# Patient Record
Sex: FEMALE | Race: WHITE | NOT HISPANIC OR LATINO | Employment: OTHER | URBAN - METROPOLITAN AREA
[De-identification: names, ages, dates, MRNs, and addresses within clinical notes are randomized per-mention and may not be internally consistent; named-entity substitution may affect disease eponyms.]

---

## 2017-06-23 ENCOUNTER — ESTABLISHED COMPREHENSIVE EXAM (OUTPATIENT)
Dept: URBAN - METROPOLITAN AREA CLINIC 46 | Facility: CLINIC | Age: 72
End: 2017-06-23

## 2017-12-20 ASSESSMENT — TONOMETRY
OS_IOP_MMHG: 13
OD_IOP_MMHG: 14

## 2017-12-20 ASSESSMENT — VISUAL ACUITY
OS_SC: 20/50+2
OS_SC: J2
OD_SC: 20/50+2
OD_SC: J2

## 2017-12-27 ENCOUNTER — ESTABLISHED PATIENT (OUTPATIENT)
Dept: URBAN - METROPOLITAN AREA CLINIC 46 | Facility: CLINIC | Age: 72
End: 2017-12-27

## 2017-12-27 DIAGNOSIS — H04.123: ICD-10-CM

## 2017-12-27 DIAGNOSIS — H35.373: ICD-10-CM

## 2017-12-27 DIAGNOSIS — H40.013: ICD-10-CM

## 2017-12-27 PROCEDURE — 1036F TOBACCO NON-USER: CPT

## 2017-12-27 PROCEDURE — 92083 EXTENDED VISUAL FIELD XM: CPT

## 2017-12-27 PROCEDURE — 92012 INTRM OPH EXAM EST PATIENT: CPT

## 2017-12-27 PROCEDURE — G8427 DOCREV CUR MEDS BY ELIG CLIN: HCPCS

## 2017-12-27 PROCEDURE — 68761S PUNCTUM PLUG / SILICONE,EACH

## 2017-12-27 ASSESSMENT — VISUAL ACUITY
OD_PH: 20/30
OS_PH: 20/30
OD_SC: 20/60-1
OS_SC: 20/40-1

## 2017-12-27 ASSESSMENT — TONOMETRY
OD_IOP_MMHG: 13
OS_IOP_MMHG: 13

## 2018-02-28 ENCOUNTER — ESTABLISHED PATIENT (OUTPATIENT)
Dept: URBAN - METROPOLITAN AREA CLINIC 46 | Facility: CLINIC | Age: 73
End: 2018-02-28

## 2018-02-28 DIAGNOSIS — H01.004: ICD-10-CM

## 2018-02-28 DIAGNOSIS — H01.001: ICD-10-CM

## 2018-02-28 DIAGNOSIS — H04.123: ICD-10-CM

## 2018-02-28 PROCEDURE — 1036F TOBACCO NON-USER: CPT

## 2018-02-28 PROCEDURE — 92012 INTRM OPH EXAM EST PATIENT: CPT

## 2018-02-28 PROCEDURE — A4262 TEMPORARY TEAR DUCT PLUG: HCPCS

## 2018-02-28 PROCEDURE — 68761Q PUNCTAL PLUG/QUINTESS DISSOLVABLE PLUG/EACH

## 2018-02-28 PROCEDURE — G8427 DOCREV CUR MEDS BY ELIG CLIN: HCPCS

## 2018-02-28 RX ORDER — NEOMYCIN SULFATE, POLYMYXIN B SULFATE AND DEXAMETHASONE 3.5; 10000; 1 MG/G; [USP'U]/G; MG/G: OINTMENT OPHTHALMIC TWICE A DAY

## 2018-02-28 ASSESSMENT — TONOMETRY
OS_IOP_MMHG: 14
OD_IOP_MMHG: 14

## 2018-02-28 ASSESSMENT — VISUAL ACUITY
OS_SC: 20/40-1
OD_SC: 20/30+2

## 2018-03-13 ENCOUNTER — ESTABLISHED PATIENT (OUTPATIENT)
Dept: URBAN - METROPOLITAN AREA CLINIC 46 | Facility: CLINIC | Age: 73
End: 2018-03-13

## 2018-03-13 DIAGNOSIS — D49.2: ICD-10-CM

## 2018-03-13 PROCEDURE — 67810 INCAL BX EYELID SKN LID MRGN: CPT

## 2018-03-13 PROCEDURE — 4040F PNEUMOC VAC/ADMIN/RCVD: CPT

## 2018-03-13 PROCEDURE — 1036F TOBACCO NON-USER: CPT

## 2018-03-13 PROCEDURE — G8428 CUR MEDS NOT DOCUMENT: HCPCS

## 2018-03-13 PROCEDURE — G8482 FLU IMMUNIZE ORDER/ADMIN: HCPCS

## 2018-03-13 PROCEDURE — 99213 OFFICE O/P EST LOW 20 MIN: CPT

## 2018-03-13 ASSESSMENT — VISUAL ACUITY
OD_SC: 20/30
OS_SC: 20/40

## 2018-06-20 ENCOUNTER — ESTABLISHED PATIENT (OUTPATIENT)
Dept: URBAN - METROPOLITAN AREA CLINIC 46 | Facility: CLINIC | Age: 73
End: 2018-06-20

## 2018-06-20 DIAGNOSIS — H40.013: ICD-10-CM

## 2018-06-20 PROCEDURE — 1036F TOBACCO NON-USER: CPT

## 2018-06-20 PROCEDURE — G8427 DOCREV CUR MEDS BY ELIG CLIN: HCPCS

## 2018-06-20 PROCEDURE — 92133 CPTRZD OPH DX IMG PST SGM ON: CPT

## 2018-06-20 PROCEDURE — 92012 INTRM OPH EXAM EST PATIENT: CPT

## 2018-06-20 RX ORDER — LATANOPROST 50 UG/ML: 1 SOLUTION/ DROPS OPHTHALMIC EVERY EVENING

## 2018-06-20 ASSESSMENT — VISUAL ACUITY
OD_SC: 20/50
OD_PH: 20/25-1
OS_SC: 20/30-1

## 2018-06-20 ASSESSMENT — TONOMETRY
OS_IOP_MMHG: 14
OD_IOP_MMHG: 15

## 2018-08-22 ENCOUNTER — FOLLOW UP (OUTPATIENT)
Dept: URBAN - METROPOLITAN AREA CLINIC 46 | Facility: CLINIC | Age: 73
End: 2018-08-22

## 2018-08-22 DIAGNOSIS — H04.123: ICD-10-CM

## 2018-08-22 DIAGNOSIS — H40.1131: ICD-10-CM

## 2018-08-22 PROCEDURE — G9903 PT SCRN TBCO ID AS NON USER: HCPCS

## 2018-08-22 PROCEDURE — 2027F OPTIC NERVE HEAD EVAL DONE: CPT

## 2018-08-22 PROCEDURE — 3285F IOP DOWN <15% OF PRE-SVC LVL: CPT

## 2018-08-22 PROCEDURE — A4262 TEMPORARY TEAR DUCT PLUG: HCPCS

## 2018-08-22 PROCEDURE — 92012 INTRM OPH EXAM EST PATIENT: CPT

## 2018-08-22 PROCEDURE — 0517F GLAUCOMA PLAN OF CARE DOCD: CPT

## 2018-08-22 PROCEDURE — G8427 DOCREV CUR MEDS BY ELIG CLIN: HCPCS

## 2018-08-22 PROCEDURE — 1036F TOBACCO NON-USER: CPT

## 2018-08-22 PROCEDURE — 68761Q PUNCTAL PLUG/QUINTESS DISSOLVABLE PLUG/EACH

## 2018-08-22 ASSESSMENT — VISUAL ACUITY
OS_SC: 20/30-2
OD_SC: 20/70+1
OD_PH: 20/40

## 2018-08-22 ASSESSMENT — TONOMETRY
OD_IOP_MMHG: 14
OS_IOP_MMHG: 15

## 2018-09-11 ENCOUNTER — CONSULT (OUTPATIENT)
Dept: URBAN - METROPOLITAN AREA CLINIC 43 | Facility: CLINIC | Age: 73
End: 2018-09-11

## 2018-09-11 DIAGNOSIS — H40.1131: ICD-10-CM

## 2018-09-11 PROCEDURE — G9903 PT SCRN TBCO ID AS NON USER: HCPCS

## 2018-09-11 PROCEDURE — 2027F OPTIC NERVE HEAD EVAL DONE: CPT

## 2018-09-11 PROCEDURE — 9222650 BILAT EXTENDED OPHTHALMOSCOPY, F/U

## 2018-09-11 PROCEDURE — 1036F TOBACCO NON-USER: CPT

## 2018-09-11 PROCEDURE — G8428 CUR MEDS NOT DOCUMENT: HCPCS

## 2018-09-11 PROCEDURE — 92250 FUNDUS PHOTOGRAPHY W/I&R: CPT

## 2018-09-11 PROCEDURE — 3284F IOP RED >=15% PRE-NTRV LVL: CPT

## 2018-09-11 PROCEDURE — 92014 COMPRE OPH EXAM EST PT 1/>: CPT

## 2018-09-11 PROCEDURE — G8785 BP SCRN NO PERF AT INTERVAL: HCPCS

## 2018-09-11 RX ORDER — PREDNISOLONE ACETATE 10 MG/ML
1 SUSPENSION/ DROPS OPHTHALMIC
Start: 2018-09-11

## 2018-09-11 ASSESSMENT — VISUAL ACUITY
OD_PH: 20/40+2
OS_PH: 20/25
OS_SC: 20/40-2
OD_SC: 20/60-1
OD_SC: J1+
OS_SC: J1

## 2018-09-11 ASSESSMENT — TONOMETRY
OS_IOP_MMHG: 13
OD_IOP_MMHG: 12

## 2018-09-25 ENCOUNTER — FOLLOW UP (OUTPATIENT)
Dept: URBAN - METROPOLITAN AREA CLINIC 43 | Facility: CLINIC | Age: 73
End: 2018-09-25

## 2018-09-25 DIAGNOSIS — H40.1131: ICD-10-CM

## 2018-09-25 PROCEDURE — 1036F TOBACCO NON-USER: CPT

## 2018-09-25 PROCEDURE — 92012 INTRM OPH EXAM EST PATIENT: CPT

## 2018-09-25 PROCEDURE — 2027F OPTIC NERVE HEAD EVAL DONE: CPT

## 2018-09-25 PROCEDURE — 0517F GLAUCOMA PLAN OF CARE DOCD: CPT

## 2018-09-25 PROCEDURE — G9903 PT SCRN TBCO ID AS NON USER: HCPCS

## 2018-09-25 PROCEDURE — G8785 BP SCRN NO PERF AT INTERVAL: HCPCS

## 2018-09-25 PROCEDURE — G8427 DOCREV CUR MEDS BY ELIG CLIN: HCPCS

## 2018-09-25 PROCEDURE — 3285F IOP DOWN <15% OF PRE-SVC LVL: CPT

## 2018-09-25 PROCEDURE — 65855 TRABECULOPLASTY LASER SURG: CPT

## 2018-09-25 RX ORDER — PREDNISOLONE ACETATE 10 MG/ML: 1 SUSPENSION/ DROPS OPHTHALMIC

## 2018-09-25 ASSESSMENT — TONOMETRY
OS_IOP_MMHG: 13
OD_IOP_MMHG: 11

## 2018-09-25 ASSESSMENT — VISUAL ACUITY
OS_SC: 20/30-1
OD_BAT: 20/50
OD_PH: 20/40-1

## 2018-10-17 ENCOUNTER — IOP CHECK (OUTPATIENT)
Dept: URBAN - METROPOLITAN AREA CLINIC 46 | Facility: CLINIC | Age: 73
End: 2018-10-17

## 2018-10-17 DIAGNOSIS — H40.013: ICD-10-CM

## 2018-10-17 DIAGNOSIS — H40.1131: ICD-10-CM

## 2018-10-17 PROCEDURE — 92012 INTRM OPH EXAM EST PATIENT: CPT

## 2018-10-17 PROCEDURE — G8427 DOCREV CUR MEDS BY ELIG CLIN: HCPCS

## 2018-10-17 PROCEDURE — G9903 PT SCRN TBCO ID AS NON USER: HCPCS

## 2018-10-17 PROCEDURE — 0517F GLAUCOMA PLAN OF CARE DOCD: CPT

## 2018-10-17 PROCEDURE — 1036F TOBACCO NON-USER: CPT

## 2018-10-17 PROCEDURE — 2027F OPTIC NERVE HEAD EVAL DONE: CPT

## 2018-10-17 PROCEDURE — 3285F IOP DOWN <15% OF PRE-SVC LVL: CPT

## 2018-10-17 ASSESSMENT — TONOMETRY
OS_IOP_MMHG: 11
OD_IOP_MMHG: 13
OD_IOP_MMHG: 14
OS_IOP_MMHG: 14

## 2018-10-17 ASSESSMENT — VISUAL ACUITY
OS_SC: 20/40-1
OD_PH: 20/40
OS_PH: 20/30-1
OD_SC: 20/70-2

## 2019-04-15 ENCOUNTER — IOP CHECK (OUTPATIENT)
Dept: URBAN - METROPOLITAN AREA CLINIC 46 | Facility: CLINIC | Age: 74
End: 2019-04-15

## 2019-04-15 DIAGNOSIS — H04.123: ICD-10-CM

## 2019-04-15 DIAGNOSIS — H40.1131: ICD-10-CM

## 2019-04-15 DIAGNOSIS — H40.013: ICD-10-CM

## 2019-04-15 PROCEDURE — 92133 CPTRZD OPH DX IMG PST SGM ON: CPT

## 2019-04-15 PROCEDURE — 92012 INTRM OPH EXAM EST PATIENT: CPT

## 2019-04-15 PROCEDURE — 68761Q PUNCTAL PLUG/QUINTESS DISSOLVABLE PLUG/EACH

## 2019-04-15 PROCEDURE — A4262 TEMPORARY TEAR DUCT PLUG: HCPCS

## 2019-04-15 ASSESSMENT — TONOMETRY
OD_IOP_MMHG: 14
OS_IOP_MMHG: 14

## 2019-04-15 ASSESSMENT — VISUAL ACUITY
OD_SC: 20/60
OS_SC: 20/40+1
OS_PH: 20/25+2

## 2019-10-17 ENCOUNTER — ESTABLISHED COMPREHENSIVE EXAM (OUTPATIENT)
Dept: URBAN - METROPOLITAN AREA CLINIC 46 | Facility: CLINIC | Age: 74
End: 2019-10-17

## 2019-10-17 DIAGNOSIS — H40.013: ICD-10-CM

## 2019-10-17 DIAGNOSIS — H40.1131: ICD-10-CM

## 2019-10-17 DIAGNOSIS — H35.3131: ICD-10-CM

## 2019-10-17 DIAGNOSIS — H04.123: ICD-10-CM

## 2019-10-17 PROCEDURE — 92015 DETERMINE REFRACTIVE STATE: CPT

## 2019-10-17 PROCEDURE — 92134 CPTRZ OPH DX IMG PST SGM RTA: CPT

## 2019-10-17 PROCEDURE — 92014 COMPRE OPH EXAM EST PT 1/>: CPT

## 2019-10-17 ASSESSMENT — TONOMETRY
OS_IOP_MMHG: 16
OD_IOP_MMHG: 15

## 2019-10-17 ASSESSMENT — VISUAL ACUITY
OD_SC: J1
OS_SC: J3
OS_SC: 20/50-1
OS_PH: 20/20-2
OD_PH: 20/25-1

## 2020-02-17 NOTE — PROCEDURE NOTE: SURGICAL
MR #: [id number]<HE />  <br />  PREOPERATIVE DIAGNOSIS: Cataract, right eye.<br />  <br />  POSTOPERATIVE DIAGNOSIS: Same.<br />  <br />  OPERATIVE PROCEDURE: Phacoemulsification with +21.0 diopter Noe &amp; Noe AAB00, PC-IOL, right eye.<br />  <br />  PROCEDURE:&nbsp; Following sedation, Montez Luevano CRNA administered topical anesthesia in the form of lidocaine 2% gel as per the anesthetic record. <br />  <br />  Prior to commencing surgery patient identification, surgical procedure, site, and side were confirmed by Dr. Jeanine Irene. &nbsp; The patient was then prepped with Betadine and draped with sterile drapes. A lid speculum was placed and the side port incision prepared. &nbsp; 0.5 cc of Epi-Shugarcaine was instilled and the anterior chamber entered at the temporal 180° limbus in a single plane fashion employing a 2.7 mm trapezoid chase and ring fixation. Viscoelastic was placed, a circular capsulorhexis performed, hydrodissection accomplished and the nucleus emulsified within the posterior chamber. Cortex was aspirated with the I/A handpiece, the posterior capsule polished and viscoelastic instilled to distend the capsular sac. A +21.0 diopter Noe &amp; Jeraldene Lyme was placed into the bag under direct visualization without difficulty employing the microinjector. Viscoelastic was aspirated with the I/A handpiece and the anterior chamber pressurized with BSS. The incision was tested for leaks and none were found. A single injection of 0.2 cc of Moxifloxacin was placed in the anterior chamber. The patient returned to the holding area having tolerated the procedure extremely well and without complication. <br />  <br />  Epi-shugarcaine consists of a mixture of 1cc epinephrine MPF 1:1000, 0.75 cc 4% lidocaine MPF and 2.25 cc BSS. Moxifloxacin consists of a mixture of Vigamox and BSS 1 mg/1 ml solution. <br />  <br />  <br />

## 2020-02-18 NOTE — PATIENT DISCUSSION
Cataract surgery has been performed in the first eye and activities of daily living are still impaired. The patient would like to proceed with cataract surgery in the second eye as scheduled. The patient elects BV OS, goal of [place].

## 2020-02-24 NOTE — PROCEDURE NOTE: SURGICAL
MR #: [id number]<UU />  <br />  PREOPERATIVE DIAGNOSIS: Cataract, left eye.<br />  <br />  POSTOPERATIVE DIAGNOSIS: Same.<br />  <br />  OPERATIVE PROCEDURE: Phacoemulsification with +21.5 diopter Noe &amp; Noe AAB00, PC-IOL, left eye.<br />  <br />  PROCEDURE:&nbsp; Following sedation, Tommie Gilbert CRNA administered topical anesthesia in the form of lidocaine 2% gel as per the anesthetic record. <br />  <br />  Prior to commencing surgery patient identification, surgical procedure, site, and side were confirmed by Dr. Kelly Joseph. &nbsp; The patient was then prepped with Betadine and draped with sterile drapes. A lid speculum was placed and the side port incision prepared. &nbsp; 0.5 cc of Epi-Shugarcaine was instilled and the anterior chamber entered at the temporal 180° limbus in a single plane fashion employing a 2.7 mm trapezoid cahse and ring fixation. Viscoelastic was placed, a circular capsulorhexis performed, hydrodissection accomplished and the nucleus emulsified within the posterior chamber. Cortex was aspirated with the I/A handpiece, the posterior capsule polished and viscoelastic instilled to distend the capsular sac. A +21.5 diopter Noe &amp; Aneesh Estrin was placed into the bag under direct visualization without difficulty employing the microinjector. Viscoelastic was aspirated with the I/A handpiece and the anterior chamber pressurized with BSS. The incision was tested for leaks and none were found. A single injection of 0.2 cc of Moxifloxacin was placed in the anterior chamber. The patient returned to the holding area having tolerated the procedure extremely well and without complication. <br />  <br />  Epi-shugarcaine consists of a mixture of 1cc epinephrine MPF 1:1000, 0.75 cc 4% lidocaine MPF and 2.25 cc BSS. Moxifloxacin consists of a mixture of Vigamox and BSS 1 mg/1 ml solution. <br />  <br />  <br />

## 2020-06-03 ENCOUNTER — FOLLOW UP (OUTPATIENT)
Dept: URBAN - METROPOLITAN AREA CLINIC 46 | Facility: CLINIC | Age: 75
End: 2020-06-03

## 2020-06-03 DIAGNOSIS — H04.123: ICD-10-CM

## 2020-06-03 DIAGNOSIS — H40.013: ICD-10-CM

## 2020-06-03 DIAGNOSIS — H40.1131: ICD-10-CM

## 2020-06-03 DIAGNOSIS — H35.3131: ICD-10-CM

## 2020-06-03 PROCEDURE — A4262 TEMPORARY TEAR DUCT PLUG: HCPCS

## 2020-06-03 PROCEDURE — 92012 INTRM OPH EXAM EST PATIENT: CPT

## 2020-06-03 PROCEDURE — 68761Q PUNCTAL PLUG/QUINTESS DISSOLVABLE PLUG/EACH

## 2020-06-03 ASSESSMENT — VISUAL ACUITY
OD_SC: 20/60+1
OS_PH: 20/25+1
OS_SC: 20/50
OD_PH: 20/25

## 2020-06-03 ASSESSMENT — TONOMETRY
OS_IOP_MMHG: 14
OD_IOP_MMHG: 14

## 2020-12-30 ENCOUNTER — ESTABLISHED COMPREHENSIVE EXAM (OUTPATIENT)
Dept: URBAN - METROPOLITAN AREA CLINIC 46 | Facility: CLINIC | Age: 75
End: 2020-12-30

## 2020-12-30 DIAGNOSIS — H40.013: ICD-10-CM

## 2020-12-30 DIAGNOSIS — H40.1131: ICD-10-CM

## 2020-12-30 DIAGNOSIS — H04.123: ICD-10-CM

## 2020-12-30 DIAGNOSIS — Z96.1: ICD-10-CM

## 2020-12-30 DIAGNOSIS — H26.491: ICD-10-CM

## 2020-12-30 DIAGNOSIS — H35.3131: ICD-10-CM

## 2020-12-30 PROCEDURE — 92015 DETERMINE REFRACTIVE STATE: CPT

## 2020-12-30 PROCEDURE — 92014 COMPRE OPH EXAM EST PT 1/>: CPT

## 2020-12-30 ASSESSMENT — VISUAL ACUITY
OD_SC: 20/40
OD_CC: 20/25-1
OS_SC: 20/40
OS_CC: 20/40-1

## 2020-12-30 ASSESSMENT — TONOMETRY
OS_IOP_MMHG: 13
OD_IOP_MMHG: 12

## 2021-07-10 NOTE — PATIENT DISCUSSION
The patient was told their pupil does not dilate well which carries an increased risk of surgical complications. Cyclogyl with surgery. No

## 2021-07-14 ENCOUNTER — FOLLOW UP (OUTPATIENT)
Dept: URBAN - METROPOLITAN AREA CLINIC 46 | Facility: CLINIC | Age: 76
End: 2021-07-14

## 2021-07-14 DIAGNOSIS — H04.123: ICD-10-CM

## 2021-07-14 DIAGNOSIS — H40.1131: ICD-10-CM

## 2021-07-14 DIAGNOSIS — H35.3131: ICD-10-CM

## 2021-07-14 DIAGNOSIS — H35.722: ICD-10-CM

## 2021-07-14 DIAGNOSIS — H40.013: ICD-10-CM

## 2021-07-14 DIAGNOSIS — H26.491: ICD-10-CM

## 2021-07-14 PROCEDURE — 68761Q PUNCTAL PLUG/QUINTESS DISSOLVABLE PLUG/EACH

## 2021-07-14 PROCEDURE — A4262 TEMPORARY TEAR DUCT PLUG: HCPCS

## 2021-07-14 PROCEDURE — 92134 CPTRZ OPH DX IMG PST SGM RTA: CPT

## 2021-07-14 PROCEDURE — 92133 CPTRZD OPH DX IMG PST SGM ON: CPT

## 2021-07-14 PROCEDURE — 92012 INTRM OPH EXAM EST PATIENT: CPT

## 2021-07-14 ASSESSMENT — TONOMETRY
OS_IOP_MMHG: 13
OD_IOP_MMHG: 13

## 2021-07-14 ASSESSMENT — VISUAL ACUITY
OD_SC: 20/40-1
OS_SC: 20/50-1
OS_PH: 20/40
OD_PH: 20/25

## 2022-01-27 ENCOUNTER — FOLLOW UP (OUTPATIENT)
Dept: URBAN - METROPOLITAN AREA CLINIC 46 | Facility: CLINIC | Age: 77
End: 2022-01-27

## 2022-01-27 DIAGNOSIS — H35.052: ICD-10-CM

## 2022-01-27 DIAGNOSIS — H40.013: ICD-10-CM

## 2022-01-27 DIAGNOSIS — H35.3131: ICD-10-CM

## 2022-01-27 DIAGNOSIS — H35.722: ICD-10-CM

## 2022-01-27 DIAGNOSIS — H40.1131: ICD-10-CM

## 2022-01-27 PROCEDURE — 92012 INTRM OPH EXAM EST PATIENT: CPT

## 2022-01-27 PROCEDURE — 92134 CPTRZ OPH DX IMG PST SGM RTA: CPT

## 2022-01-27 PROCEDURE — 92083 EXTENDED VISUAL FIELD XM: CPT

## 2022-01-27 ASSESSMENT — VISUAL ACUITY
OS_CC: 20/50-1
OD_CC: 20/30-2
OS_PH: 20/25-2

## 2022-01-27 ASSESSMENT — TONOMETRY
OD_IOP_MMHG: 14
OS_IOP_MMHG: 15

## 2022-02-07 ENCOUNTER — CONSULTATION/EVALUATION (OUTPATIENT)
Dept: URBAN - METROPOLITAN AREA CLINIC 43 | Facility: CLINIC | Age: 77
End: 2022-02-07

## 2022-02-07 DIAGNOSIS — H35.721: ICD-10-CM

## 2022-02-07 DIAGNOSIS — H35.341: ICD-10-CM

## 2022-02-07 DIAGNOSIS — H35.732: ICD-10-CM

## 2022-02-07 DIAGNOSIS — H35.3112: ICD-10-CM

## 2022-02-07 DIAGNOSIS — H04.123: ICD-10-CM

## 2022-02-07 DIAGNOSIS — H35.3221: ICD-10-CM

## 2022-02-07 DIAGNOSIS — H40.013: ICD-10-CM

## 2022-02-07 DIAGNOSIS — H35.363: ICD-10-CM

## 2022-02-07 PROCEDURE — 92134 CPTRZ OPH DX IMG PST SGM RTA: CPT

## 2022-02-07 PROCEDURE — 67028 INJECTION EYE DRUG: CPT

## 2022-02-07 PROCEDURE — 99214 OFFICE O/P EST MOD 30 MIN: CPT

## 2022-02-07 PROCEDURE — 92242 FLUORESCEIN&ICG ANGIOGRAPHY: CPT

## 2022-02-07 ASSESSMENT — TONOMETRY
OD_IOP_MMHG: 18
OS_IOP_MMHG: 14

## 2022-02-07 ASSESSMENT — VISUAL ACUITY
OD_CC: 20/30-2
OS_CC: 20/70-2

## 2022-02-22 ENCOUNTER — ESTABLISHED PATIENT (OUTPATIENT)
Dept: URBAN - METROPOLITAN AREA CLINIC 44 | Facility: CLINIC | Age: 77
End: 2022-02-22

## 2022-02-22 DIAGNOSIS — H04.123: ICD-10-CM

## 2022-02-22 PROCEDURE — 99213 OFFICE O/P EST LOW 20 MIN: CPT

## 2022-02-22 ASSESSMENT — VISUAL ACUITY
OS_CC: 20/70-1
OD_CC: 20/30

## 2022-03-07 ENCOUNTER — CLINIC PROCEDURE ONLY (OUTPATIENT)
Dept: URBAN - METROPOLITAN AREA CLINIC 43 | Facility: CLINIC | Age: 77
End: 2022-03-07

## 2022-03-07 DIAGNOSIS — H35.3221: ICD-10-CM

## 2022-03-07 PROCEDURE — 67028 INJECTION EYE DRUG: CPT

## 2022-03-07 PROCEDURE — 92134 CPTRZ OPH DX IMG PST SGM RTA: CPT

## 2022-03-07 ASSESSMENT — VISUAL ACUITY
OS_PH: 20/60-1
OD_CC: 20/30-2
OS_CC: 20/70-2

## 2022-03-07 ASSESSMENT — TONOMETRY
OD_IOP_MMHG: 13
OS_IOP_MMHG: 12

## 2022-03-11 ENCOUNTER — FOLLOW UP (OUTPATIENT)
Dept: URBAN - METROPOLITAN AREA CLINIC 46 | Facility: CLINIC | Age: 77
End: 2022-03-11

## 2022-03-11 DIAGNOSIS — H35.732: ICD-10-CM

## 2022-03-11 DIAGNOSIS — H35.3221: ICD-10-CM

## 2022-03-11 DIAGNOSIS — H40.1131: ICD-10-CM

## 2022-03-11 DIAGNOSIS — H35.363: ICD-10-CM

## 2022-03-11 DIAGNOSIS — H04.123: ICD-10-CM

## 2022-03-11 DIAGNOSIS — H35.721: ICD-10-CM

## 2022-03-11 DIAGNOSIS — H35.341: ICD-10-CM

## 2022-03-11 PROCEDURE — A4262 TEMPORARY TEAR DUCT PLUG: HCPCS

## 2022-03-11 PROCEDURE — 68761Q PUNCTAL PLUG/QUINTESS DISSOLVABLE PLUG/EACH

## 2022-03-11 PROCEDURE — 92012 INTRM OPH EXAM EST PATIENT: CPT

## 2022-03-11 ASSESSMENT — VISUAL ACUITY
OD_CC: 20/30-1
OS_PH: 20/60-1
OS_CC: 20/70-2

## 2022-03-11 ASSESSMENT — TONOMETRY
OS_IOP_MMHG: 13
OD_IOP_MMHG: 17

## 2022-04-12 ENCOUNTER — CLINIC PROCEDURE ONLY (OUTPATIENT)
Dept: URBAN - METROPOLITAN AREA CLINIC 46 | Facility: CLINIC | Age: 77
End: 2022-04-12

## 2022-04-12 DIAGNOSIS — H35.3221: ICD-10-CM

## 2022-04-12 DIAGNOSIS — H35.732: ICD-10-CM

## 2022-04-12 PROCEDURE — 92134 CPTRZ OPH DX IMG PST SGM RTA: CPT

## 2022-04-12 PROCEDURE — 92242 FLUORESCEIN&ICG ANGIOGRAPHY: CPT

## 2022-04-12 PROCEDURE — 67028 INJECTION EYE DRUG: CPT

## 2022-04-12 ASSESSMENT — TONOMETRY
OD_IOP_MMHG: 18
OS_IOP_MMHG: 17

## 2022-04-12 ASSESSMENT — VISUAL ACUITY
OS_PH: 20/20-3
OD_SC: 20/25+2
OS_SC: 20/70-2

## 2022-08-09 ENCOUNTER — ESTABLISHED PATIENT (OUTPATIENT)
Dept: URBAN - METROPOLITAN AREA CLINIC 46 | Facility: CLINIC | Age: 77
End: 2022-08-09

## 2022-08-09 DIAGNOSIS — H35.363: ICD-10-CM

## 2022-08-09 DIAGNOSIS — H35.373: ICD-10-CM

## 2022-08-09 DIAGNOSIS — H35.3221: ICD-10-CM

## 2022-08-09 DIAGNOSIS — H35.3112: ICD-10-CM

## 2022-08-09 DIAGNOSIS — H35.721: ICD-10-CM

## 2022-08-09 DIAGNOSIS — H35.341: ICD-10-CM

## 2022-08-09 DIAGNOSIS — H40.1131: ICD-10-CM

## 2022-08-09 DIAGNOSIS — H35.732: ICD-10-CM

## 2022-08-09 PROCEDURE — 99214 OFFICE O/P EST MOD 30 MIN: CPT

## 2022-08-09 PROCEDURE — 92242 FLUORESCEIN&ICG ANGIOGRAPHY: CPT

## 2022-08-09 PROCEDURE — 92134 CPTRZ OPH DX IMG PST SGM RTA: CPT

## 2022-08-09 PROCEDURE — 67028 INJECTION EYE DRUG: CPT

## 2022-08-09 ASSESSMENT — VISUAL ACUITY
OD_SC: 20/25
OS_SC: 20/70-1
OS_PH: 20/25-2

## 2022-08-09 ASSESSMENT — TONOMETRY
OD_IOP_MMHG: 16
OS_IOP_MMHG: 18

## 2022-08-11 ENCOUNTER — FOLLOW UP (OUTPATIENT)
Dept: URBAN - METROPOLITAN AREA CLINIC 46 | Facility: CLINIC | Age: 77
End: 2022-08-11

## 2022-08-11 DIAGNOSIS — H40.013: ICD-10-CM

## 2022-08-11 DIAGNOSIS — H04.123: ICD-10-CM

## 2022-08-11 DIAGNOSIS — H35.3112: ICD-10-CM

## 2022-08-11 DIAGNOSIS — H35.3221: ICD-10-CM

## 2022-08-11 DIAGNOSIS — H35.721: ICD-10-CM

## 2022-08-11 DIAGNOSIS — H40.1131: ICD-10-CM

## 2022-08-11 DIAGNOSIS — H35.341: ICD-10-CM

## 2022-08-11 DIAGNOSIS — H35.732: ICD-10-CM

## 2022-08-11 PROCEDURE — 92133 CPTRZD OPH DX IMG PST SGM ON: CPT

## 2022-08-11 PROCEDURE — 92012 INTRM OPH EXAM EST PATIENT: CPT

## 2022-08-11 ASSESSMENT — VISUAL ACUITY
OD_SC: 20/25+1
OS_SC: 20/60-2
OS_PH: 20/30

## 2022-08-11 ASSESSMENT — TONOMETRY
OD_IOP_MMHG: 15
OS_IOP_MMHG: 14

## 2022-10-04 ENCOUNTER — CLINIC PROCEDURE ONLY (OUTPATIENT)
Dept: URBAN - METROPOLITAN AREA CLINIC 39 | Facility: CLINIC | Age: 77
End: 2022-10-04

## 2022-10-04 DIAGNOSIS — H35.732: ICD-10-CM

## 2022-10-04 DIAGNOSIS — H35.3221: ICD-10-CM

## 2022-10-04 PROCEDURE — 67028 INJECTION EYE DRUG: CPT

## 2022-10-04 PROCEDURE — 92250 FUNDUS PHOTOGRAPHY W/I&R: CPT

## 2022-10-04 ASSESSMENT — VISUAL ACUITY
OS_CC: 20/40+2
OD_CC: 20/25

## 2022-10-04 ASSESSMENT — TONOMETRY
OS_IOP_MMHG: 17
OD_IOP_MMHG: 16

## 2022-12-05 ENCOUNTER — APPOINTMENT (RX ONLY)
Dept: URBAN - METROPOLITAN AREA CLINIC 137 | Facility: CLINIC | Age: 77
Setting detail: DERMATOLOGY
End: 2022-12-05

## 2022-12-05 DIAGNOSIS — Z71.89 OTHER SPECIFIED COUNSELING: ICD-10-CM

## 2022-12-05 DIAGNOSIS — L82.1 OTHER SEBORRHEIC KERATOSIS: ICD-10-CM | Status: UNCHANGED

## 2022-12-05 DIAGNOSIS — L85.3 XEROSIS CUTIS: ICD-10-CM

## 2022-12-05 DIAGNOSIS — L30.4 ERYTHEMA INTERTRIGO: ICD-10-CM

## 2022-12-05 DIAGNOSIS — D18.0 HEMANGIOMA: ICD-10-CM | Status: UNCHANGED

## 2022-12-05 DIAGNOSIS — L57.8 OTHER SKIN CHANGES DUE TO CHRONIC EXPOSURE TO NONIONIZING RADIATION: ICD-10-CM | Status: UNCHANGED

## 2022-12-05 DIAGNOSIS — L82.0 INFLAMED SEBORRHEIC KERATOSIS: ICD-10-CM

## 2022-12-05 DIAGNOSIS — D22 MELANOCYTIC NEVI: ICD-10-CM

## 2022-12-05 PROBLEM — D22.5 MELANOCYTIC NEVI OF TRUNK: Status: ACTIVE | Noted: 2022-12-05

## 2022-12-05 PROBLEM — D18.01 HEMANGIOMA OF SKIN AND SUBCUTANEOUS TISSUE: Status: ACTIVE | Noted: 2022-12-05

## 2022-12-05 PROCEDURE — ? POINTED OUT BY PATIENT

## 2022-12-05 PROCEDURE — ? COUNSELING

## 2022-12-05 PROCEDURE — 17110 DESTRUCTION B9 LES UP TO 14: CPT

## 2022-12-05 PROCEDURE — ? LIQUID NITROGEN

## 2022-12-05 PROCEDURE — 99213 OFFICE O/P EST LOW 20 MIN: CPT | Mod: 25

## 2022-12-05 PROCEDURE — ? SUNSCREEN RECOMMENDATIONS

## 2022-12-05 PROCEDURE — ? OTC TREATMENT REGIMEN

## 2022-12-05 ASSESSMENT — LOCATION SIMPLE DESCRIPTION DERM
LOCATION SIMPLE: RIGHT UPPER BACK
LOCATION SIMPLE: RIGHT PRETIBIAL REGION
LOCATION SIMPLE: UPPER BACK
LOCATION SIMPLE: CHEST
LOCATION SIMPLE: RIGHT EYEBROW
LOCATION SIMPLE: LEFT UPPER ARM
LOCATION SIMPLE: ABDOMEN
LOCATION SIMPLE: LEFT PRETIBIAL REGION
LOCATION SIMPLE: LEFT SHOULDER
LOCATION SIMPLE: LEFT CLAVICULAR SKIN
LOCATION SIMPLE: RIGHT CALF
LOCATION SIMPLE: RIGHT BREAST
LOCATION SIMPLE: NOSE
LOCATION SIMPLE: LEFT CALF

## 2022-12-05 ASSESSMENT — LOCATION ZONE DERM
LOCATION ZONE: NOSE
LOCATION ZONE: FACE
LOCATION ZONE: ARM
LOCATION ZONE: LEG
LOCATION ZONE: TRUNK

## 2022-12-05 ASSESSMENT — LOCATION DETAILED DESCRIPTION DERM
LOCATION DETAILED: LEFT RIB CAGE
LOCATION DETAILED: RIGHT LATERAL SUPERIOR CHEST
LOCATION DETAILED: INFERIOR THORACIC SPINE
LOCATION DETAILED: NASAL DORSUM
LOCATION DETAILED: LEFT MEDIAL SUPERIOR CHEST
LOCATION DETAILED: LEFT DISTAL CALF
LOCATION DETAILED: SUBXIPHOID
LOCATION DETAILED: RIGHT INFRAMAMMARY CREASE (INNER QUADRANT)
LOCATION DETAILED: LEFT ANTERIOR SHOULDER
LOCATION DETAILED: LEFT ANTERIOR PROXIMAL UPPER ARM
LOCATION DETAILED: LEFT CLAVICULAR SKIN
LOCATION DETAILED: RIGHT DISTAL PRETIBIAL REGION
LOCATION DETAILED: LEFT DISTAL PRETIBIAL REGION
LOCATION DETAILED: RIGHT LATERAL EYEBROW
LOCATION DETAILED: PERIUMBILICAL SKIN
LOCATION DETAILED: RIGHT DISTAL CALF
LOCATION DETAILED: RIGHT SUPERIOR UPPER BACK

## 2022-12-05 NOTE — PROCEDURE: OTC TREATMENT REGIMEN
Detail Level: Zone
Patient Specific Otc Recommendations (Will Not Stick From Patient To Patient): Eucerin cream
Patient Specific Otc Recommendations (Will Not Stick From Patient To Patient): Breonna Hurt

## 2022-12-05 NOTE — PROCEDURE: LIQUID NITROGEN
Render Post-Care Instructions In Note?: no
Show Aperture Variable?: Yes
Number Of Freeze-Thaw Cycles: 2 freeze-thaw cycles
Medical Necessity Information: It is in your best interest to select a reason for this procedure from the list below. All of these items fulfill various CMS LCD requirements except the new and changing color options.
Medical Necessity Clause: This procedure was medically necessary because the lesions that were treated were:
Detail Level: Detailed
Duration Of Freeze Thaw-Cycle (Seconds): 2
Post-Care Instructions: I reviewed with the patient in detail post-care instructions. Patient is to wear sunprotection, and avoid picking at any of the treated lesions. Pt may apply Vaseline to crusted or scabbing areas.
Spray Paint Text: The liquid nitrogen was applied to the skin utilizing a spray paint frosting technique.
Consent: The patient's consent was obtained including but not limited to risks of crusting, scabbing, blistering, scarring, darker or lighter pigmentary change, recurrence, incomplete removal and infection.
Application Tool (Optional): Liquid Nitrogen Sprayer

## 2022-12-06 ENCOUNTER — ESTABLISHED PATIENT (OUTPATIENT)
Dept: URBAN - METROPOLITAN AREA CLINIC 43 | Facility: CLINIC | Age: 77
End: 2022-12-06

## 2022-12-06 PROCEDURE — 92242 FLUORESCEIN&ICG ANGIOGRAPHY: CPT

## 2022-12-06 PROCEDURE — 99213 OFFICE O/P EST LOW 20 MIN: CPT

## 2022-12-06 PROCEDURE — 67028 INJECTION EYE DRUG: CPT

## 2022-12-06 PROCEDURE — 92134 CPTRZ OPH DX IMG PST SGM RTA: CPT

## 2022-12-06 ASSESSMENT — VISUAL ACUITY
OD_PH: 20/30-2
OU_SC: 20/40-1
OD_SC: 20/40+2
OS_SC: 20/70-1

## 2022-12-06 ASSESSMENT — TONOMETRY
OD_IOP_MMHG: 19
OS_IOP_MMHG: 16

## 2023-01-17 ENCOUNTER — ESTABLISHED PATIENT (OUTPATIENT)
Dept: URBAN - METROPOLITAN AREA CLINIC 43 | Facility: CLINIC | Age: 78
End: 2023-01-17

## 2023-01-17 DIAGNOSIS — H35.732: ICD-10-CM

## 2023-01-17 DIAGNOSIS — H35.363: ICD-10-CM

## 2023-01-17 DIAGNOSIS — H35.30: ICD-10-CM

## 2023-01-17 DIAGNOSIS — Z98.890: ICD-10-CM

## 2023-01-17 DIAGNOSIS — H35.371: ICD-10-CM

## 2023-01-17 DIAGNOSIS — H26.491: ICD-10-CM

## 2023-01-17 DIAGNOSIS — H35.3221: ICD-10-CM

## 2023-01-17 DIAGNOSIS — H40.1131: ICD-10-CM

## 2023-01-17 DIAGNOSIS — H35.721: ICD-10-CM

## 2023-01-17 DIAGNOSIS — H35.3112: ICD-10-CM

## 2023-01-17 DIAGNOSIS — H04.123: ICD-10-CM

## 2023-01-17 DIAGNOSIS — H40.013: ICD-10-CM

## 2023-01-17 PROCEDURE — 92250 FUNDUS PHOTOGRAPHY W/I&R: CPT

## 2023-01-17 PROCEDURE — 67028 INJECTION EYE DRUG: CPT

## 2023-01-17 PROCEDURE — 99213 OFFICE O/P EST LOW 20 MIN: CPT

## 2023-01-17 ASSESSMENT — VISUAL ACUITY
OD_CC: 20/40-2
OS_CC: 20/50-1

## 2023-01-17 ASSESSMENT — TONOMETRY
OS_IOP_MMHG: 12
OD_IOP_MMHG: 12

## 2023-04-18 ENCOUNTER — ESTABLISHED PATIENT (OUTPATIENT)
Dept: URBAN - METROPOLITAN AREA CLINIC 46 | Facility: CLINIC | Age: 78
End: 2023-04-18

## 2023-04-18 DIAGNOSIS — H35.371: ICD-10-CM

## 2023-04-18 DIAGNOSIS — H35.721: ICD-10-CM

## 2023-04-18 DIAGNOSIS — H35.732: ICD-10-CM

## 2023-04-18 DIAGNOSIS — H35.363: ICD-10-CM

## 2023-04-18 DIAGNOSIS — H35.3221: ICD-10-CM

## 2023-04-18 DIAGNOSIS — H04.123: ICD-10-CM

## 2023-04-18 DIAGNOSIS — H35.30: ICD-10-CM

## 2023-04-18 DIAGNOSIS — Z98.890: ICD-10-CM

## 2023-04-18 DIAGNOSIS — H26.491: ICD-10-CM

## 2023-04-18 DIAGNOSIS — H40.1131: ICD-10-CM

## 2023-04-18 DIAGNOSIS — H40.013: ICD-10-CM

## 2023-04-18 DIAGNOSIS — H35.3112: ICD-10-CM

## 2023-04-18 PROCEDURE — 99214 OFFICE O/P EST MOD 30 MIN: CPT

## 2023-04-18 PROCEDURE — 67028 INJECTION EYE DRUG: CPT

## 2023-04-18 PROCEDURE — 92242 FLUORESCEIN&ICG ANGIOGRAPHY: CPT

## 2023-04-18 PROCEDURE — 92134 CPTRZ OPH DX IMG PST SGM RTA: CPT

## 2023-04-18 ASSESSMENT — TONOMETRY
OD_IOP_MMHG: 9
OS_IOP_MMHG: 10

## 2023-04-18 ASSESSMENT — VISUAL ACUITY
OS_CC: 20/50-2
OD_CC: 20/20-2

## 2023-05-23 ENCOUNTER — ESTABLISHED PATIENT (OUTPATIENT)
Dept: URBAN - METROPOLITAN AREA CLINIC 46 | Facility: CLINIC | Age: 78
End: 2023-05-23

## 2023-05-23 DIAGNOSIS — H35.30: ICD-10-CM

## 2023-05-23 DIAGNOSIS — H40.013: ICD-10-CM

## 2023-05-23 DIAGNOSIS — H35.363: ICD-10-CM

## 2023-05-23 DIAGNOSIS — H35.373: ICD-10-CM

## 2023-05-23 DIAGNOSIS — Z98.890: ICD-10-CM

## 2023-05-23 DIAGNOSIS — H04.123: ICD-10-CM

## 2023-05-23 DIAGNOSIS — H35.3112: ICD-10-CM

## 2023-05-23 DIAGNOSIS — H40.1131: ICD-10-CM

## 2023-05-23 DIAGNOSIS — H35.732: ICD-10-CM

## 2023-05-23 DIAGNOSIS — H35.3221: ICD-10-CM

## 2023-05-23 DIAGNOSIS — H35.371: ICD-10-CM

## 2023-05-23 DIAGNOSIS — H35.721: ICD-10-CM

## 2023-05-23 DIAGNOSIS — H26.491: ICD-10-CM

## 2023-05-23 PROCEDURE — 99214 OFFICE O/P EST MOD 30 MIN: CPT

## 2023-05-23 PROCEDURE — J0178PRE EYLEA PREFILLED SYRINGE

## 2023-05-23 PROCEDURE — 92250 FUNDUS PHOTOGRAPHY W/I&R: CPT

## 2023-05-23 PROCEDURE — 67028 INJECTION EYE DRUG: CPT

## 2023-05-23 ASSESSMENT — VISUAL ACUITY
OS_SC: 20/50
OD_SC: 20/30-1

## 2023-05-23 ASSESSMENT — TONOMETRY
OD_IOP_MMHG: 11
OS_IOP_MMHG: 13

## 2023-06-01 ENCOUNTER — COMPREHENSIVE EXAM (OUTPATIENT)
Dept: URBAN - METROPOLITAN AREA CLINIC 46 | Facility: CLINIC | Age: 78
End: 2023-06-01

## 2023-06-01 DIAGNOSIS — H35.363: ICD-10-CM

## 2023-06-01 DIAGNOSIS — H35.3221: ICD-10-CM

## 2023-06-01 DIAGNOSIS — H35.3112: ICD-10-CM

## 2023-06-01 DIAGNOSIS — H35.373: ICD-10-CM

## 2023-06-01 DIAGNOSIS — H35.721: ICD-10-CM

## 2023-06-01 DIAGNOSIS — H04.123: ICD-10-CM

## 2023-06-01 DIAGNOSIS — H35.732: ICD-10-CM

## 2023-06-01 DIAGNOSIS — H40.1131: ICD-10-CM

## 2023-06-01 DIAGNOSIS — Z98.890: ICD-10-CM

## 2023-06-01 DIAGNOSIS — H35.30: ICD-10-CM

## 2023-06-01 DIAGNOSIS — H40.013: ICD-10-CM

## 2023-06-01 DIAGNOSIS — H26.491: ICD-10-CM

## 2023-06-01 PROCEDURE — 92083 EXTENDED VISUAL FIELD XM: CPT

## 2023-06-01 PROCEDURE — 92133 CPTRZD OPH DX IMG PST SGM ON: CPT

## 2023-06-01 PROCEDURE — 92012 INTRM OPH EXAM EST PATIENT: CPT

## 2023-06-01 PROCEDURE — 92015 DETERMINE REFRACTIVE STATE: CPT

## 2023-06-01 PROCEDURE — A4262 TEMPORARY TEAR DUCT PLUG: HCPCS

## 2023-06-01 PROCEDURE — 68761Q PUNCTAL PLUG/QUINTESS DISSOLVABLE PLUG/EACH

## 2023-06-01 ASSESSMENT — VISUAL ACUITY
OD_CC: 20/20-2
OS_CC: 20/25-1
OU_SC: 20/40
OS_SC: 20/70
OD_SC: J2
OU_CC: 20/20-1
OU_SC: J1
OS_SC: J3
OD_SC: 20/50
OS_PH: 20/25

## 2023-06-01 ASSESSMENT — TONOMETRY
OS_IOP_MMHG: 13
OD_IOP_MMHG: 13

## 2023-06-20 ENCOUNTER — CLINIC PROCEDURE ONLY (OUTPATIENT)
Dept: URBAN - METROPOLITAN AREA CLINIC 46 | Facility: CLINIC | Age: 78
End: 2023-06-20

## 2023-06-20 DIAGNOSIS — H35.732: ICD-10-CM

## 2023-06-20 DIAGNOSIS — H35.3221: ICD-10-CM

## 2023-06-20 PROCEDURE — 67028 INJECTION EYE DRUG: CPT

## 2023-06-20 PROCEDURE — 92250 FUNDUS PHOTOGRAPHY W/I&R: CPT

## 2023-06-20 PROCEDURE — J0178PRE EYLEA PREFILLED SYRINGE

## 2023-06-20 ASSESSMENT — TONOMETRY
OS_IOP_MMHG: 17
OD_IOP_MMHG: 18

## 2023-06-20 ASSESSMENT — VISUAL ACUITY
OS_CC: 20/40+1
OD_CC: 20/25
OS_PH: 20/20-1

## 2023-08-08 ENCOUNTER — ESTABLISHED PATIENT (OUTPATIENT)
Dept: URBAN - METROPOLITAN AREA CLINIC 46 | Facility: CLINIC | Age: 78
End: 2023-08-08

## 2023-08-08 DIAGNOSIS — H35.3112: ICD-10-CM

## 2023-08-08 DIAGNOSIS — H35.732: ICD-10-CM

## 2023-08-08 DIAGNOSIS — H35.363: ICD-10-CM

## 2023-08-08 DIAGNOSIS — H35.3221: ICD-10-CM

## 2023-08-08 DIAGNOSIS — H35.373: ICD-10-CM

## 2023-08-08 DIAGNOSIS — H35.30: ICD-10-CM

## 2023-08-08 DIAGNOSIS — H35.721: ICD-10-CM

## 2023-08-08 PROCEDURE — 92242 FLUORESCEIN&ICG ANGIOGRAPHY: CPT

## 2023-08-08 PROCEDURE — 67028 INJECTION EYE DRUG: CPT

## 2023-08-08 PROCEDURE — 99213 OFFICE O/P EST LOW 20 MIN: CPT

## 2023-08-08 PROCEDURE — 92134 CPTRZ OPH DX IMG PST SGM RTA: CPT

## 2023-08-08 PROCEDURE — J0178PRE EYLEA PREFILLED SYRINGE

## 2023-08-08 ASSESSMENT — TONOMETRY
OS_IOP_MMHG: 14
OD_IOP_MMHG: 14

## 2023-08-08 ASSESSMENT — VISUAL ACUITY
OS_CC: 20/40
OD_CC: 20/20
OS_PH: 20/20

## 2023-09-07 ENCOUNTER — EMERGENCY VISIT (OUTPATIENT)
Dept: URBAN - METROPOLITAN AREA CLINIC 46 | Facility: CLINIC | Age: 78
End: 2023-09-07

## 2023-09-07 DIAGNOSIS — H35.371: ICD-10-CM

## 2023-09-07 DIAGNOSIS — H35.363: ICD-10-CM

## 2023-09-07 DIAGNOSIS — H35.3112: ICD-10-CM

## 2023-09-07 DIAGNOSIS — H35.372: ICD-10-CM

## 2023-09-07 DIAGNOSIS — H10.021: ICD-10-CM

## 2023-09-07 PROCEDURE — 99213 OFFICE O/P EST LOW 20 MIN: CPT

## 2023-09-07 RX ORDER — MOXIFLOXACIN OPHTHALMIC 5 MG/ML: 1 SOLUTION/ DROPS OPHTHALMIC

## 2023-09-07 ASSESSMENT — TONOMETRY
OS_IOP_MMHG: 16
OD_IOP_MMHG: 16

## 2023-09-07 ASSESSMENT — VISUAL ACUITY
OU_CC: 20/40
OD_CC: 20/50-1
OS_CC: 20/60-1

## 2023-10-03 ENCOUNTER — CLINIC PROCEDURE ONLY (OUTPATIENT)
Dept: URBAN - METROPOLITAN AREA CLINIC 46 | Facility: CLINIC | Age: 78
End: 2023-10-03

## 2023-10-03 DIAGNOSIS — H35.732: ICD-10-CM

## 2023-10-03 DIAGNOSIS — H35.3221: ICD-10-CM

## 2023-10-03 PROCEDURE — 92250 FUNDUS PHOTOGRAPHY W/I&R: CPT

## 2023-10-03 PROCEDURE — 67028 INJECTION EYE DRUG: CPT

## 2023-10-03 PROCEDURE — J0178PRE EYLEA PREFILLED SYRINGE: Mod: JZ,LT

## 2023-10-03 ASSESSMENT — VISUAL ACUITY
OS_PH: 20/30-1
OD_CC: 20/20-1
OS_CC: 20/50+1

## 2023-10-03 ASSESSMENT — TONOMETRY
OS_IOP_MMHG: 11
OD_IOP_MMHG: 13

## 2023-12-06 ENCOUNTER — APPOINTMENT (RX ONLY)
Dept: URBAN - METROPOLITAN AREA CLINIC 137 | Facility: CLINIC | Age: 78
Setting detail: DERMATOLOGY
End: 2023-12-06

## 2023-12-06 DIAGNOSIS — L82.1 OTHER SEBORRHEIC KERATOSIS: ICD-10-CM | Status: UNCHANGED

## 2023-12-06 DIAGNOSIS — L30.4 ERYTHEMA INTERTRIGO: ICD-10-CM

## 2023-12-06 DIAGNOSIS — L82.0 INFLAMED SEBORRHEIC KERATOSIS: ICD-10-CM

## 2023-12-06 DIAGNOSIS — Z71.89 OTHER SPECIFIED COUNSELING: ICD-10-CM

## 2023-12-06 DIAGNOSIS — L57.0 ACTINIC KERATOSIS: ICD-10-CM

## 2023-12-06 DIAGNOSIS — D49.2 NEOPLASM OF UNSPECIFIED BEHAVIOR OF BONE, SOFT TISSUE, AND SKIN: ICD-10-CM

## 2023-12-06 DIAGNOSIS — L57.8 OTHER SKIN CHANGES DUE TO CHRONIC EXPOSURE TO NONIONIZING RADIATION: ICD-10-CM | Status: UNCHANGED

## 2023-12-06 DIAGNOSIS — D18.0 HEMANGIOMA: ICD-10-CM | Status: UNCHANGED

## 2023-12-06 DIAGNOSIS — L85.3 XEROSIS CUTIS: ICD-10-CM

## 2023-12-06 PROBLEM — D18.01 HEMANGIOMA OF SKIN AND SUBCUTANEOUS TISSUE: Status: ACTIVE | Noted: 2023-12-06

## 2023-12-06 PROCEDURE — 99213 OFFICE O/P EST LOW 20 MIN: CPT | Mod: 25

## 2023-12-06 PROCEDURE — ? SUNSCREEN RECOMMENDATIONS

## 2023-12-06 PROCEDURE — 17110 DESTRUCTION B9 LES UP TO 14: CPT

## 2023-12-06 PROCEDURE — ? PRESCRIPTION

## 2023-12-06 PROCEDURE — ? OTC TREATMENT REGIMEN

## 2023-12-06 PROCEDURE — ? PRESCRIPTION MEDICATION MANAGEMENT

## 2023-12-06 PROCEDURE — ? LIQUID NITROGEN

## 2023-12-06 PROCEDURE — 17000 DESTRUCT PREMALG LESION: CPT | Mod: 59

## 2023-12-06 PROCEDURE — 11102 TANGNTL BX SKIN SINGLE LES: CPT | Mod: 59

## 2023-12-06 PROCEDURE — ? COUNSELING

## 2023-12-06 PROCEDURE — ? BIOPSY BY SHAVE METHOD

## 2023-12-06 PROCEDURE — 17003 DESTRUCT PREMALG LES 2-14: CPT | Mod: 59

## 2023-12-06 RX ORDER — KETOCONAZOLE 20 MG/G
CREAM TOPICAL BID
Qty: 60 | Refills: 2 | Status: ERX | COMMUNITY
Start: 2023-12-06

## 2023-12-06 RX ADMIN — KETOCONAZOLE: 20 CREAM TOPICAL at 00:00

## 2023-12-06 ASSESSMENT — LOCATION DETAILED DESCRIPTION DERM
LOCATION DETAILED: RIGHT INFERIOR LATERAL FOREHEAD
LOCATION DETAILED: LEFT MEDIAL UPPER BACK
LOCATION DETAILED: INFERIOR THORACIC SPINE
LOCATION DETAILED: LEFT POSTERIOR ANKLE
LOCATION DETAILED: RIGHT MEDIAL BREAST 5-6:00 REGION
LOCATION DETAILED: LEFT POSTERIOR SHOULDER
LOCATION DETAILED: LEFT MEDIAL BREAST 7-8:00 REGION
LOCATION DETAILED: RIGHT DISTAL PRETIBIAL REGION
LOCATION DETAILED: LEFT MEDIAL SUPERIOR CHEST
LOCATION DETAILED: PERIUMBILICAL SKIN
LOCATION DETAILED: RIGHT SUPERIOR UPPER BACK
LOCATION DETAILED: LEFT CENTRAL MALAR CHEEK
LOCATION DETAILED: LEFT MID-UPPER BACK
LOCATION DETAILED: LEFT LATERAL UPPER BACK
LOCATION DETAILED: LEFT DISTAL PRETIBIAL REGION
LOCATION DETAILED: RIGHT DISTAL CALF
LOCATION DETAILED: LEFT INFERIOR CENTRAL MALAR CHEEK

## 2023-12-06 ASSESSMENT — LOCATION SIMPLE DESCRIPTION DERM
LOCATION SIMPLE: UPPER BACK
LOCATION SIMPLE: LEFT PRETIBIAL REGION
LOCATION SIMPLE: LEFT SHOULDER
LOCATION SIMPLE: RIGHT CALF
LOCATION SIMPLE: RIGHT BREAST
LOCATION SIMPLE: LEFT UPPER BACK
LOCATION SIMPLE: LEFT CHEEK
LOCATION SIMPLE: ABDOMEN
LOCATION SIMPLE: CHEST
LOCATION SIMPLE: LEFT BREAST
LOCATION SIMPLE: LEFT ANKLE
LOCATION SIMPLE: RIGHT FOREHEAD
LOCATION SIMPLE: RIGHT PRETIBIAL REGION
LOCATION SIMPLE: RIGHT UPPER BACK

## 2023-12-06 ASSESSMENT — LOCATION ZONE DERM
LOCATION ZONE: TRUNK
LOCATION ZONE: ARM
LOCATION ZONE: FACE
LOCATION ZONE: LEG

## 2023-12-06 NOTE — PROCEDURE: OTC TREATMENT REGIMEN
Detail Level: Zone
Patient Specific Otc Recommendations (Will Not Stick From Patient To Patient): Recommend moisturizing cream daily
Patient Specific Otc Recommendations (Will Not Stick From Patient To Patient): Recommend zeasorb powder daily as needed.

## 2023-12-06 NOTE — HPI: PREVENTATIVE SKIN CHECK
What Is The Reason For Today's Visit?: Full Body Skin Examination
Additional History: Areas of concern back x3, under breast.

## 2023-12-06 NOTE — PROCEDURE: BIOPSY BY SHAVE METHOD
Detail Level: Detailed
Depth Of Biopsy: dermis
Was A Bandage Applied: Yes
Size Of Lesion In Cm: 0.3
X Size Of Lesion In Cm: 0
Biopsy Type: H and E
Biopsy Method: Personna blade
Anesthesia Type: 1% lidocaine with epinephrine and a 1:10 solution of 8.4% sodium bicarbonate
Anesthesia Volume In Cc: 0.5
Hemostasis: Electrocautery
Wound Care: Aquaphor
Dressing: Band-Aid
Destruction After The Procedure: No
Type Of Destruction Used: Curettage
Curettage Text: The wound bed was treated with curettage after the biopsy was performed.
Cryotherapy Text: The wound bed was treated with cryotherapy after the biopsy was performed.
Electrodesiccation Text: The wound bed was treated with electrodesiccation after the biopsy was performed.
Electrodesiccation And Curettage Text: The wound bed was treated with electrodesiccation and curettage after the biopsy was performed.
Silver Nitrate Text: The wound bed was treated with silver nitrate after the biopsy was performed.
Lab: -[id number]
Consent: Written consent was obtained and risks were reviewed including but not limited to scarring, infection, bleeding, scabbing, incomplete removal, nerve damage and allergy to anesthesia.
Post-Care Instructions: I reviewed with the patient in detail post-care instructions. Patient is to keep the biopsy site dry overnight, and then apply bacitracin twice daily until healed. Patient may apply hydrogen peroxide soaks to remove any crusting.
Notification Instructions: Patient will be notified of biopsy results. However, patient instructed to call the office if not contacted within 2 weeks.
Billing Type: Third-Party Bill
Information: Selecting Yes will display possible errors in your note based on the variables you have selected. This validation is only offered as a suggestion for you. PLEASE NOTE THAT THE VALIDATION TEXT WILL BE REMOVED WHEN YOU FINALIZE YOUR NOTE. IF YOU WANT TO FAX A PRELIMINARY NOTE YOU WILL NEED TO TOGGLE THIS TO 'NO' IF YOU DO NOT WANT IT IN YOUR FAXED NOTE.

## 2023-12-06 NOTE — PROCEDURE: PRESCRIPTION MEDICATION MANAGEMENT
Render In Strict Bullet Format?: No
Initiate Treatment: Ketoconazole cream - apply to skin folds under breasts twice a day x 2 weeks then stop for 1 week. repeat as needed
Detail Level: Zone

## 2023-12-06 NOTE — PROCEDURE: LIQUID NITROGEN
Consent: The patient's consent was obtained including but not limited to risks of crusting, scabbing, blistering, scarring, darker or lighter pigmentary change, recurrence, incomplete removal and infection.
Application Tool (Optional): Liquid Nitrogen Sprayer
Show Topical Anesthesia Variable?: Yes
Render Post-Care Instructions In Note?: no
Number Of Freeze-Thaw Cycles: 2 freeze-thaw cycles
Medical Necessity Clause: This procedure was medically necessary because the lesions that were treated were:
Post-Care Instructions: I reviewed with the patient in detail post-care instructions. Patient is to wear sunprotection, and avoid picking at any of the treated lesions. Pt may apply Vaseline to crusted or scabbing areas.
Duration Of Freeze Thaw-Cycle (Seconds): 2
Detail Level: Detailed
Medical Necessity Information: It is in your best interest to select a reason for this procedure from the list below. All of these items fulfill various CMS LCD requirements except the new and changing color options.
Spray Paint Text: The liquid nitrogen was applied to the skin utilizing a spray paint frosting technique.
Duration Of Freeze Thaw-Cycle (Seconds): 1

## 2023-12-12 ENCOUNTER — ESTABLISHED PATIENT (OUTPATIENT)
Dept: URBAN - METROPOLITAN AREA CLINIC 46 | Facility: CLINIC | Age: 78
End: 2023-12-12

## 2023-12-12 DIAGNOSIS — H35.363: ICD-10-CM

## 2023-12-12 DIAGNOSIS — H35.732: ICD-10-CM

## 2023-12-12 DIAGNOSIS — H35.373: ICD-10-CM

## 2023-12-12 DIAGNOSIS — H35.3112: ICD-10-CM

## 2023-12-12 DIAGNOSIS — H35.3221: ICD-10-CM

## 2023-12-12 DIAGNOSIS — H35.721: ICD-10-CM

## 2023-12-12 DIAGNOSIS — H35.30: ICD-10-CM

## 2023-12-12 PROCEDURE — J0178PRE EYLEA PREFILLED SYRINGE

## 2023-12-12 PROCEDURE — 92134 CPTRZ OPH DX IMG PST SGM RTA: CPT

## 2023-12-12 PROCEDURE — 92242 FLUORESCEIN&ICG ANGIOGRAPHY: CPT

## 2023-12-12 PROCEDURE — 99213 OFFICE O/P EST LOW 20 MIN: CPT

## 2023-12-12 PROCEDURE — 67028 INJECTION EYE DRUG: CPT

## 2023-12-12 ASSESSMENT — VISUAL ACUITY
OS_PH: 20/25-1
OS_SC: 20/40-1
OD_SC: 20/20

## 2023-12-12 ASSESSMENT — TONOMETRY
OD_IOP_MMHG: 11
OS_IOP_MMHG: 11

## 2023-12-21 ENCOUNTER — FOLLOW UP (OUTPATIENT)
Dept: URBAN - METROPOLITAN AREA CLINIC 46 | Facility: CLINIC | Age: 78
End: 2023-12-21

## 2023-12-21 DIAGNOSIS — H40.013: ICD-10-CM

## 2023-12-21 DIAGNOSIS — H02.882: ICD-10-CM

## 2023-12-21 DIAGNOSIS — H04.123: ICD-10-CM

## 2023-12-21 DIAGNOSIS — H01.004: ICD-10-CM

## 2023-12-21 DIAGNOSIS — H02.885: ICD-10-CM

## 2023-12-21 DIAGNOSIS — H01.001: ICD-10-CM

## 2023-12-21 DIAGNOSIS — Z98.890: ICD-10-CM

## 2023-12-21 PROCEDURE — 92012 INTRM OPH EXAM EST PATIENT: CPT

## 2023-12-21 PROCEDURE — A4262 TEMPORARY TEAR DUCT PLUG: HCPCS

## 2023-12-21 PROCEDURE — 68761Q PUNCTAL PLUG/QUINTESS DISSOLVABLE PLUG/EACH

## 2023-12-21 ASSESSMENT — TONOMETRY
OD_IOP_MMHG: 12
OS_IOP_MMHG: 12

## 2023-12-21 ASSESSMENT — VISUAL ACUITY
OD_CC: 20/20
OS_CC: 20/30
OU_CC: 20/20

## 2024-03-05 ENCOUNTER — ESTABLISHED PATIENT (OUTPATIENT)
Dept: URBAN - METROPOLITAN AREA CLINIC 46 | Facility: CLINIC | Age: 79
End: 2024-03-05

## 2024-03-05 DIAGNOSIS — H35.721: ICD-10-CM

## 2024-03-05 DIAGNOSIS — H35.732: ICD-10-CM

## 2024-03-05 DIAGNOSIS — H35.363: ICD-10-CM

## 2024-03-05 DIAGNOSIS — H35.30: ICD-10-CM

## 2024-03-05 DIAGNOSIS — H35.3112: ICD-10-CM

## 2024-03-05 DIAGNOSIS — H35.373: ICD-10-CM

## 2024-03-05 DIAGNOSIS — H35.3221: ICD-10-CM

## 2024-03-05 PROCEDURE — 92250 FUNDUS PHOTOGRAPHY W/I&R: CPT

## 2024-03-05 PROCEDURE — J0178PRE EYLEA PREFILLED SYRINGE

## 2024-03-05 PROCEDURE — 99213 OFFICE O/P EST LOW 20 MIN: CPT

## 2024-03-05 PROCEDURE — 92273 FULL FIELD ERG W/I&R: CPT

## 2024-03-05 PROCEDURE — 67028 INJECTION EYE DRUG: CPT

## 2024-03-05 ASSESSMENT — VISUAL ACUITY
OD_CC: 20/30+2
OS_PH: 20/25-1
OS_CC: 20/40-1+2

## 2024-03-05 ASSESSMENT — TONOMETRY
OD_IOP_MMHG: 11
OS_IOP_MMHG: 09

## 2024-04-30 ENCOUNTER — CLINIC PROCEDURE ONLY (OUTPATIENT)
Dept: URBAN - METROPOLITAN AREA CLINIC 46 | Facility: CLINIC | Age: 79
End: 2024-04-30

## 2024-04-30 DIAGNOSIS — H35.732: ICD-10-CM

## 2024-04-30 DIAGNOSIS — H35.3221: ICD-10-CM

## 2024-04-30 PROCEDURE — 67028 INJECTION EYE DRUG: CPT

## 2024-04-30 PROCEDURE — J0178PRE EYLEA PREFILLED SYRINGE: Mod: JZ,LT

## 2024-04-30 PROCEDURE — 92250 FUNDUS PHOTOGRAPHY W/I&R: CPT

## 2024-04-30 ASSESSMENT — TONOMETRY
OD_IOP_MMHG: 15
OS_IOP_MMHG: 16

## 2024-04-30 ASSESSMENT — VISUAL ACUITY
OS_CC: 20/60-1
OD_CC: 20/25-1

## 2024-06-25 ENCOUNTER — ESTABLISHED PATIENT (OUTPATIENT)
Dept: URBAN - METROPOLITAN AREA CLINIC 46 | Facility: CLINIC | Age: 79
End: 2024-06-25

## 2024-06-25 DIAGNOSIS — H35.363: ICD-10-CM

## 2024-06-25 DIAGNOSIS — H35.373: ICD-10-CM

## 2024-06-25 DIAGNOSIS — H35.721: ICD-10-CM

## 2024-06-25 DIAGNOSIS — H35.3112: ICD-10-CM

## 2024-06-25 DIAGNOSIS — H35.3221: ICD-10-CM

## 2024-06-25 DIAGNOSIS — H35.30: ICD-10-CM

## 2024-06-25 DIAGNOSIS — H04.123: ICD-10-CM

## 2024-06-25 DIAGNOSIS — H35.732: ICD-10-CM

## 2024-06-25 PROCEDURE — 92242 FLUORESCEIN&ICG ANGIOGRAPHY: CPT

## 2024-06-25 PROCEDURE — 67028 INJECTION EYE DRUG: CPT

## 2024-06-25 PROCEDURE — 92134 CPTRZ OPH DX IMG PST SGM RTA: CPT

## 2024-06-25 PROCEDURE — 99214 OFFICE O/P EST MOD 30 MIN: CPT | Mod: 25

## 2024-06-25 ASSESSMENT — TONOMETRY
OS_IOP_MMHG: 15
OD_IOP_MMHG: 14

## 2024-06-25 ASSESSMENT — VISUAL ACUITY
OS_CC: 20/40+1
OD_CC: 20/30+1
OS_PH: 20/25+1

## 2024-07-01 ENCOUNTER — CLINIC PROCEDURE ONLY (OUTPATIENT)
Dept: URBAN - METROPOLITAN AREA CLINIC 46 | Facility: CLINIC | Age: 79
End: 2024-07-01

## 2024-07-01 DIAGNOSIS — H35.732: ICD-10-CM

## 2024-07-01 DIAGNOSIS — H35.373: ICD-10-CM

## 2024-07-01 DIAGNOSIS — H35.363: ICD-10-CM

## 2024-07-01 DIAGNOSIS — H35.3112: ICD-10-CM

## 2024-07-01 DIAGNOSIS — H04.123: ICD-10-CM

## 2024-07-01 DIAGNOSIS — H35.721: ICD-10-CM

## 2024-07-01 DIAGNOSIS — H35.3221: ICD-10-CM

## 2024-07-01 DIAGNOSIS — H35.30: ICD-10-CM

## 2024-07-01 PROCEDURE — 99211T TECH SERVICE

## 2024-07-01 PROCEDURE — A4262 TEMPORARY TEAR DUCT PLUG: HCPCS | Mod: E2,E4

## 2024-07-01 PROCEDURE — 68761Q PUNCTAL PLUG/QUINTESS DISSOLVABLE PLUG/EACH: Mod: E4,E2

## 2024-08-06 ENCOUNTER — CLINIC PROCEDURE ONLY (OUTPATIENT)
Dept: URBAN - METROPOLITAN AREA CLINIC 46 | Facility: CLINIC | Age: 79
End: 2024-08-06

## 2024-08-06 DIAGNOSIS — H35.3221: ICD-10-CM

## 2024-08-06 PROCEDURE — 67028 INJECTION EYE DRUG: CPT

## 2024-08-06 PROCEDURE — 92250 FUNDUS PHOTOGRAPHY W/I&R: CPT

## 2024-08-06 ASSESSMENT — TONOMETRY
OS_IOP_MMHG: 15
OD_IOP_MMHG: 10

## 2024-08-06 ASSESSMENT — VISUAL ACUITY
OD_CC: 20/30+1
OS_CC: 20/50+2
OS_PH: 20/40

## 2024-09-17 ENCOUNTER — COMPREHENSIVE EXAM (OUTPATIENT)
Dept: URBAN - METROPOLITAN AREA CLINIC 46 | Facility: CLINIC | Age: 79
End: 2024-09-17

## 2024-09-17 DIAGNOSIS — H35.732: ICD-10-CM

## 2024-09-17 DIAGNOSIS — H04.123: ICD-10-CM

## 2024-09-17 DIAGNOSIS — H35.3112: ICD-10-CM

## 2024-09-17 DIAGNOSIS — H35.3221: ICD-10-CM

## 2024-09-17 DIAGNOSIS — H35.30: ICD-10-CM

## 2024-09-17 DIAGNOSIS — H35.721: ICD-10-CM

## 2024-09-17 DIAGNOSIS — H35.373: ICD-10-CM

## 2024-09-17 DIAGNOSIS — H35.363: ICD-10-CM

## 2024-09-17 PROCEDURE — 92250 FUNDUS PHOTOGRAPHY W/I&R: CPT

## 2024-09-17 PROCEDURE — 67028 INJECTION EYE DRUG: CPT

## 2024-09-17 PROCEDURE — 99213 OFFICE O/P EST LOW 20 MIN: CPT | Mod: 25

## 2024-11-12 ENCOUNTER — CLINIC PROCEDURE ONLY (OUTPATIENT)
Dept: URBAN - METROPOLITAN AREA CLINIC 46 | Facility: CLINIC | Age: 79
End: 2024-11-12

## 2024-11-12 DIAGNOSIS — H35.732: ICD-10-CM

## 2024-11-12 DIAGNOSIS — H35.3221: ICD-10-CM

## 2024-11-12 PROCEDURE — 92242 FLUORESCEIN&ICG ANGIOGRAPHY: CPT

## 2024-11-12 PROCEDURE — 92134 CPTRZ OPH DX IMG PST SGM RTA: CPT

## 2024-11-12 PROCEDURE — J2777PFS VABYSMO PFS: Mod: JZ,LT

## 2024-11-12 PROCEDURE — 67028 INJECTION EYE DRUG: CPT

## 2024-12-09 ENCOUNTER — APPOINTMENT (OUTPATIENT)
Dept: URBAN - METROPOLITAN AREA CLINIC 137 | Facility: CLINIC | Age: 79
Setting detail: DERMATOLOGY
End: 2024-12-09

## 2024-12-09 DIAGNOSIS — Z71.89 OTHER SPECIFIED COUNSELING: ICD-10-CM

## 2024-12-09 DIAGNOSIS — D18.0 HEMANGIOMA: ICD-10-CM

## 2024-12-09 DIAGNOSIS — L82.1 OTHER SEBORRHEIC KERATOSIS: ICD-10-CM

## 2024-12-09 DIAGNOSIS — D49.2 NEOPLASM OF UNSPECIFIED BEHAVIOR OF BONE, SOFT TISSUE, AND SKIN: ICD-10-CM

## 2024-12-09 DIAGNOSIS — L81.4 OTHER MELANIN HYPERPIGMENTATION: ICD-10-CM

## 2024-12-09 DIAGNOSIS — L57.0 ACTINIC KERATOSIS: ICD-10-CM

## 2024-12-09 DIAGNOSIS — L57.8 OTHER SKIN CHANGES DUE TO CHRONIC EXPOSURE TO NONIONIZING RADIATION: ICD-10-CM

## 2024-12-09 PROBLEM — D18.01 HEMANGIOMA OF SKIN AND SUBCUTANEOUS TISSUE: Status: ACTIVE | Noted: 2024-12-09

## 2024-12-09 PROCEDURE — 11102 TANGNTL BX SKIN SINGLE LES: CPT

## 2024-12-09 PROCEDURE — ? BIOPSY BY SHAVE METHOD

## 2024-12-09 PROCEDURE — 17000 DESTRUCT PREMALG LESION: CPT | Mod: 59

## 2024-12-09 PROCEDURE — ? LIQUID NITROGEN

## 2024-12-09 PROCEDURE — 99213 OFFICE O/P EST LOW 20 MIN: CPT | Mod: 25

## 2024-12-09 PROCEDURE — ? COUNSELING

## 2024-12-09 ASSESSMENT — LOCATION DETAILED DESCRIPTION DERM
LOCATION DETAILED: RIGHT DISTAL DORSAL FOREARM
LOCATION DETAILED: LEFT DISTAL PRETIBIAL REGION
LOCATION DETAILED: SUPERIOR THORACIC SPINE
LOCATION DETAILED: RIGHT DISTAL PRETIBIAL REGION
LOCATION DETAILED: LEFT INFERIOR MEDIAL MALAR CHEEK
LOCATION DETAILED: LEFT SUPERIOR PREAURICULAR CHEEK
LOCATION DETAILED: EPIGASTRIC SKIN
LOCATION DETAILED: LEFT PROXIMAL DORSAL FOREARM
LOCATION DETAILED: RIGHT VENTRAL DISTAL FOREARM
LOCATION DETAILED: RIGHT INFERIOR CENTRAL MALAR CHEEK
LOCATION DETAILED: RIGHT INFERIOR MEDIAL UPPER BACK
LOCATION DETAILED: LEFT DISTAL CALF
LOCATION DETAILED: RIGHT DISTAL CALF
LOCATION DETAILED: RIGHT MEDIAL UPPER BACK
LOCATION DETAILED: UPPER STERNUM
LOCATION DETAILED: MIDDLE STERNUM

## 2024-12-09 ASSESSMENT — LOCATION SIMPLE DESCRIPTION DERM
LOCATION SIMPLE: RIGHT PRETIBIAL REGION
LOCATION SIMPLE: RIGHT CHEEK
LOCATION SIMPLE: LEFT CALF
LOCATION SIMPLE: RIGHT FOREARM
LOCATION SIMPLE: LEFT PRETIBIAL REGION
LOCATION SIMPLE: ABDOMEN
LOCATION SIMPLE: CHEST
LOCATION SIMPLE: RIGHT UPPER BACK
LOCATION SIMPLE: LEFT FOREARM
LOCATION SIMPLE: RIGHT CALF
LOCATION SIMPLE: LEFT CHEEK
LOCATION SIMPLE: UPPER BACK

## 2024-12-09 ASSESSMENT — LOCATION ZONE DERM
LOCATION ZONE: LEG
LOCATION ZONE: FACE
LOCATION ZONE: ARM
LOCATION ZONE: TRUNK

## 2024-12-09 NOTE — PROCEDURE: LIQUID NITROGEN
Show Aperture Variable?: Yes
Number Of Freeze-Thaw Cycles: 2 freeze-thaw cycles
Detail Level: Detailed
Application Tool (Optional): Liquid Nitrogen Sprayer
Duration Of Freeze Thaw-Cycle (Seconds): 1
Consent: The patient's consent was obtained including but not limited to risks of crusting, scabbing, blistering, scarring, darker or lighter pigmentary change, recurrence, incomplete removal and infection.
Post-Care Instructions: I reviewed with the patient in detail post-care instructions. Patient is to wear sunprotection, and avoid picking at any of the treated lesions. Pt may apply Vaseline to crusted or scabbing areas.
Render Post-Care Instructions In Note?: no

## 2024-12-09 NOTE — PROCEDURE: BIOPSY BY SHAVE METHOD
Detail Level: Detailed
Depth Of Biopsy: dermis
Was A Bandage Applied: Yes
Size Of Lesion In Cm: 0
Biopsy Type: H and E
Biopsy Method: Personna blade
Anesthesia Type: 1% lidocaine with epinephrine and a 1:10 solution of 8.4% sodium bicarbonate
Anesthesia Volume In Cc: 0.5
Hemostasis: Electrocautery
Wound Care: Aquaphor
Dressing: Band-Aid
Destruction After The Procedure: No
Type Of Destruction Used: Curettage
Curettage Text: The wound bed was treated with curettage after the biopsy was performed.
Cryotherapy Text: The wound bed was treated with cryotherapy after the biopsy was performed.
Electrodesiccation Text: The wound bed was treated with electrodesiccation after the biopsy was performed.
Electrodesiccation And Curettage Text: The wound bed was treated with electrodesiccation and curettage after the biopsy was performed.
Silver Nitrate Text: The wound bed was treated with silver nitrate after the biopsy was performed.
Lab: -[id number]
Medical Necessity Information: It is in your best interest to select a reason for this procedure from the list below. All of these items fulfill various CMS LCD requirements except the new and changing color options.
Consent: Written consent was obtained and risks were reviewed including but not limited to scarring, infection, bleeding, scabbing, incomplete removal, nerve damage and allergy to anesthesia.
Post-Care Instructions: I reviewed with the patient in detail post-care instructions. Patient is to keep the biopsy site dry overnight, and then apply bacitracin twice daily until healed. Patient may apply hydrogen peroxide soaks to remove any crusting.
Notification Instructions: Patient will be notified of biopsy results. However, patient instructed to call the office if not contacted within 2 weeks.
Billing Type: Third-Party Bill
Information: Selecting Yes will display possible errors in your note based on the variables you have selected. This validation is only offered as a suggestion for you. PLEASE NOTE THAT THE VALIDATION TEXT WILL BE REMOVED WHEN YOU FINALIZE YOUR NOTE. IF YOU WANT TO FAX A PRELIMINARY NOTE YOU WILL NEED TO TOGGLE THIS TO 'NO' IF YOU DO NOT WANT IT IN YOUR FAXED NOTE.

## 2025-01-07 ENCOUNTER — COMPREHENSIVE EXAM (OUTPATIENT)
Age: 80
End: 2025-01-07

## 2025-01-07 DIAGNOSIS — H35.721: ICD-10-CM

## 2025-01-07 DIAGNOSIS — H04.123: ICD-10-CM

## 2025-01-07 DIAGNOSIS — H35.363: ICD-10-CM

## 2025-01-07 DIAGNOSIS — H35.30: ICD-10-CM

## 2025-01-07 DIAGNOSIS — H35.373: ICD-10-CM

## 2025-01-07 DIAGNOSIS — H35.732: ICD-10-CM

## 2025-01-07 DIAGNOSIS — H35.3112: ICD-10-CM

## 2025-01-07 DIAGNOSIS — H35.3221: ICD-10-CM

## 2025-01-07 PROCEDURE — 92250 FUNDUS PHOTOGRAPHY W/I&R: CPT

## 2025-01-07 PROCEDURE — 99213 OFFICE O/P EST LOW 20 MIN: CPT | Mod: 25

## 2025-01-07 PROCEDURE — 67028 INJECTION EYE DRUG: CPT

## 2025-01-07 PROCEDURE — 92273 FULL FIELD ERG W/I&R: CPT

## 2025-01-07 PROCEDURE — J2777PFS VABYSMO PFS: Mod: JZ,LT

## 2025-01-27 ENCOUNTER — FOLLOW UP (OUTPATIENT)
Age: 80
End: 2025-01-27

## 2025-01-27 DIAGNOSIS — H35.3221: ICD-10-CM

## 2025-01-27 DIAGNOSIS — H40.1131: ICD-10-CM

## 2025-01-27 DIAGNOSIS — H26.491: ICD-10-CM

## 2025-01-27 DIAGNOSIS — H35.363: ICD-10-CM

## 2025-01-27 DIAGNOSIS — H35.373: ICD-10-CM

## 2025-01-27 DIAGNOSIS — H01.024: ICD-10-CM

## 2025-01-27 DIAGNOSIS — H04.123: ICD-10-CM

## 2025-01-27 DIAGNOSIS — Z98.890: ICD-10-CM

## 2025-01-27 DIAGNOSIS — H40.013: ICD-10-CM

## 2025-01-27 DIAGNOSIS — H35.3112: ICD-10-CM

## 2025-01-27 DIAGNOSIS — H01.021: ICD-10-CM

## 2025-01-27 PROCEDURE — 92012 INTRM OPH EXAM EST PATIENT: CPT | Mod: 25

## 2025-01-27 PROCEDURE — A4262 TEMPORARY TEAR DUCT PLUG: HCPCS | Mod: E2,E4

## 2025-01-27 PROCEDURE — 68761Q PUNCTAL PLUG/QUINTESS DISSOLVABLE PLUG/EACH: Mod: E2,E4

## 2025-03-04 ENCOUNTER — CLINIC PROCEDURE ONLY (OUTPATIENT)
Age: 80
End: 2025-03-04

## 2025-03-04 DIAGNOSIS — H35.3221: ICD-10-CM

## 2025-03-04 DIAGNOSIS — H35.732: ICD-10-CM

## 2025-03-04 PROCEDURE — 67028 INJECTION EYE DRUG: CPT

## 2025-03-04 PROCEDURE — 92250 FUNDUS PHOTOGRAPHY W/I&R: CPT

## 2025-03-04 PROCEDURE — J2777PFS VABYSMO PFS: Mod: JZ,LT

## 2025-04-15 ENCOUNTER — COMPREHENSIVE EXAM (OUTPATIENT)
Age: 80
End: 2025-04-15

## 2025-04-15 DIAGNOSIS — H40.1131: ICD-10-CM

## 2025-04-15 DIAGNOSIS — H35.373: ICD-10-CM

## 2025-04-15 DIAGNOSIS — H35.3112: ICD-10-CM

## 2025-04-15 DIAGNOSIS — H35.721: ICD-10-CM

## 2025-04-15 DIAGNOSIS — H35.3221: ICD-10-CM

## 2025-04-15 DIAGNOSIS — H35.732: ICD-10-CM

## 2025-04-15 PROCEDURE — 99213 OFFICE O/P EST LOW 20 MIN: CPT | Mod: 25

## 2025-04-15 PROCEDURE — J2777PFS VABYSMO PFS: Mod: JZ,LT

## 2025-04-15 PROCEDURE — 92242 FLUORESCEIN&ICG ANGIOGRAPHY: CPT

## 2025-04-15 PROCEDURE — 92134 CPTRZ OPH DX IMG PST SGM RTA: CPT

## 2025-04-15 PROCEDURE — 67028 INJECTION EYE DRUG: CPT

## 2025-06-10 ENCOUNTER — CLINIC PROCEDURE ONLY (OUTPATIENT)
Age: 80
End: 2025-06-10

## 2025-06-10 DIAGNOSIS — H35.732: ICD-10-CM

## 2025-06-10 DIAGNOSIS — H35.3221: ICD-10-CM

## 2025-06-10 PROCEDURE — 92250 FUNDUS PHOTOGRAPHY W/I&R: CPT

## 2025-06-10 PROCEDURE — J2777PFS VABYSMO PFS: Mod: JZ,LT

## 2025-06-10 PROCEDURE — 67028 INJECTION EYE DRUG: CPT

## 2025-07-07 ENCOUNTER — COMPREHENSIVE EXAM (OUTPATIENT)
Age: 80
End: 2025-07-07

## 2025-07-07 DIAGNOSIS — H35.3112: ICD-10-CM

## 2025-07-07 DIAGNOSIS — H40.1131: ICD-10-CM

## 2025-07-07 DIAGNOSIS — H40.013: ICD-10-CM

## 2025-07-07 DIAGNOSIS — H35.30: ICD-10-CM

## 2025-07-07 DIAGNOSIS — H26.491: ICD-10-CM

## 2025-07-07 DIAGNOSIS — H35.373: ICD-10-CM

## 2025-07-07 DIAGNOSIS — H35.3221: ICD-10-CM

## 2025-07-07 DIAGNOSIS — H35.363: ICD-10-CM

## 2025-07-07 DIAGNOSIS — H04.123: ICD-10-CM

## 2025-07-07 DIAGNOSIS — Z98.890: ICD-10-CM

## 2025-07-07 PROCEDURE — 92133 CPTRZD OPH DX IMG PST SGM ON: CPT

## 2025-07-07 PROCEDURE — 92015 DETERMINE REFRACTIVE STATE: CPT

## 2025-07-07 PROCEDURE — A4262 TEMPORARY TEAR DUCT PLUG: HCPCS | Mod: E2,E4

## 2025-07-07 PROCEDURE — 92014 COMPRE OPH EXAM EST PT 1/>: CPT | Mod: 25

## 2025-07-07 PROCEDURE — 68761Q PUNCTAL PLUG/QUINTESS DISSOLVABLE PLUG/EACH: Mod: E2,E4

## 2025-08-05 ENCOUNTER — CLINIC PROCEDURE ONLY (OUTPATIENT)
Age: 80
End: 2025-08-05

## 2025-08-05 DIAGNOSIS — H35.3221: ICD-10-CM

## 2025-08-05 DIAGNOSIS — H35.732: ICD-10-CM

## 2025-08-05 PROCEDURE — 92242 FLUORESCEIN&ICG ANGIOGRAPHY: CPT

## 2025-08-05 PROCEDURE — 92273 FULL FIELD ERG W/I&R: CPT

## 2025-08-05 PROCEDURE — 67028 INJECTION EYE DRUG: CPT

## 2025-08-05 PROCEDURE — J2777PFS VABYSMO PFS: Mod: JZ,LT
